# Patient Record
Sex: FEMALE | Race: WHITE | NOT HISPANIC OR LATINO | Employment: FULL TIME | URBAN - METROPOLITAN AREA
[De-identification: names, ages, dates, MRNs, and addresses within clinical notes are randomized per-mention and may not be internally consistent; named-entity substitution may affect disease eponyms.]

---

## 2019-09-27 ENCOUNTER — OFFICE VISIT (OUTPATIENT)
Dept: OBGYN CLINIC | Facility: CLINIC | Age: 39
End: 2019-09-27
Payer: COMMERCIAL

## 2019-09-27 VITALS — DIASTOLIC BLOOD PRESSURE: 86 MMHG | BODY MASS INDEX: 33.67 KG/M2 | WEIGHT: 208.6 LBS | SYSTOLIC BLOOD PRESSURE: 142 MMHG

## 2019-09-27 DIAGNOSIS — N92.0 MENORRHAGIA WITH REGULAR CYCLE: ICD-10-CM

## 2019-09-27 DIAGNOSIS — Z01.419 ENCOUNTER FOR GYNECOLOGICAL EXAMINATION: Primary | ICD-10-CM

## 2019-09-27 DIAGNOSIS — Z12.39 SCREENING FOR MALIGNANT NEOPLASM OF BREAST: ICD-10-CM

## 2019-09-27 PROCEDURE — 99386 PREV VISIT NEW AGE 40-64: CPT | Performed by: NURSE PRACTITIONER

## 2019-09-27 PROCEDURE — G0145 SCR C/V CYTO,THINLAYER,RESCR: HCPCS | Performed by: NURSE PRACTITIONER

## 2019-09-27 PROCEDURE — 87624 HPV HI-RISK TYP POOLED RSLT: CPT | Performed by: NURSE PRACTITIONER

## 2019-09-27 RX ORDER — VILAZODONE HYDROCHLORIDE 10 MG/1
10 TABLET ORAL DAILY
Refills: 0 | COMMUNITY
Start: 2019-07-02 | End: 2020-09-29

## 2019-09-27 NOTE — PROGRESS NOTES
Payam Min  1980      CC:  Yearly exam    S:  35 y o  female is a new patient here for yearly exam  She denies breast concerns, abdominal/pelvic pain, abnormal vaginal discharge, bladder/bowel dysfunction  Her cycles are regular, but very heavy and crampy  Also has headaches the first 2 days of menses  Would like something to help with her menses  She is sexually active with same sex partner  She uses nothing for contraception  She is now going through a divorce with her  and has a girlfriend     LTCS x 2  Last Pap: 9/10/14 neg/neg  Last Mammo: 5 years ago d/t family hx of breast cancer  Sister at age 22 and aunt 54  Both BRCA gene negative  Has a son 12yo and daughter 7yo  Current Outpatient Medications:     VIIBRYD 10 MG tablet, Take 10 mg by mouth daily, Disp: , Rfl: 0  Social History     Socioeconomic History    Marital status: /Civil Union     Spouse name: Not on file    Number of children: Not on file    Years of education: Not on file    Highest education level: Not on file   Occupational History    Not on file   Social Needs    Financial resource strain: Not on file    Food insecurity:     Worry: Not on file     Inability: Not on file    Transportation needs:     Medical: Not on file     Non-medical: Not on file   Tobacco Use    Smoking status: Never Smoker    Smokeless tobacco: Never Used   Substance and Sexual Activity    Alcohol use:  Yes    Drug use: Yes     Types: Marijuana    Sexual activity: Yes     Partners: Male     Birth control/protection: None   Lifestyle    Physical activity:     Days per week: Not on file     Minutes per session: Not on file    Stress: Not on file   Relationships    Social connections:     Talks on phone: Not on file     Gets together: Not on file     Attends Tenriism service: Not on file     Active member of club or organization: Not on file     Attends meetings of clubs or organizations: Not on file     Relationship status: Not on file    Intimate partner violence:     Fear of current or ex partner: Not on file     Emotionally abused: Not on file     Physically abused: Not on file     Forced sexual activity: Not on file   Other Topics Concern    Not on file   Social History Narrative    Not on file     Family History   Problem Relation Age of Onset    Hyperlipidemia Father     Hypertension Father     Breast cancer Sister     Breast cancer Maternal Aunt     Osteoporosis Maternal Aunt       Past Medical History:   Diagnosis Date    Abnormal Pap smear of cervix     Arthritis     Asthma     Depression         O:  Blood pressure 142/86, weight 94 6 kg (208 lb 9 6 oz)  Patient appears well and is not in distress  Neck is supple without masses  Breasts are symmetrical without mass, tenderness, nipple discharge, skin changes or adenopathy  Abdomen is soft and nontender without masses  External genitals are normal without lesions or rashes  Vagina is normal without discharge or bleeding  Cervix is normal without discharge or lesion  Very anterior  Uterus is normal, mobile, nontender without palpable mass  Adnexa are normal, nontender, without palpable mass  A:  Yearly exam      P:   Pap collected today  Reviewed ASCCP guidelines with patient  Mammo slip provided  Recommend monthly SBE, annual CBE, and annual screening mammogram   Discussed options to help with menses including OCP, Nuvaring, depo, IUD, Lysteda  Patient is requesting a Mirena IUD  Risks and benefits discussed and reviewed insertion procedure  Recommend 200mcg Cytotec PO 12 hours prior to procedure, 600mg Ibuprofen PO one hour prior  Information pamphlet on Mirena provided for review  RTO one year for yearly exam or sooner as needed

## 2019-10-01 LAB
HPV HR 12 DNA CVX QL NAA+PROBE: NEGATIVE
HPV16 DNA CVX QL NAA+PROBE: NEGATIVE
HPV18 DNA CVX QL NAA+PROBE: NEGATIVE

## 2019-10-02 LAB
LAB AP GYN PRIMARY INTERPRETATION: NORMAL
Lab: NORMAL

## 2019-11-11 ENCOUNTER — TELEPHONE (OUTPATIENT)
Dept: OBGYN CLINIC | Facility: CLINIC | Age: 39
End: 2019-11-11

## 2019-11-15 ENCOUNTER — TELEPHONE (OUTPATIENT)
Dept: OBGYN CLINIC | Facility: CLINIC | Age: 39
End: 2019-11-15

## 2019-11-15 DIAGNOSIS — Z30.430 ENCOUNTER FOR IUD INSERTION: Primary | ICD-10-CM

## 2019-11-15 RX ORDER — MISOPROSTOL 200 UG/1
TABLET ORAL
Qty: 1 TABLET | Refills: 0 | Status: SHIPPED | OUTPATIENT
Start: 2019-11-15 | End: 2019-12-19

## 2019-11-15 NOTE — TELEPHONE ENCOUNTER
Called patient and left message recommending that patient take Cytotec 200mcg PO 12 hours prior to IUD insertion  Rx sent to [facility] on file

## 2019-11-15 NOTE — TELEPHONE ENCOUNTER
Buy and bill Mirena labeled and will be brought [facility]  Pt can be scheduled for insertion with Vidhya

## 2019-11-19 ENCOUNTER — PROCEDURE VISIT (OUTPATIENT)
Dept: OBGYN CLINIC | Facility: CLINIC | Age: 39
End: 2019-11-19
Payer: COMMERCIAL

## 2019-11-19 VITALS — DIASTOLIC BLOOD PRESSURE: 72 MMHG | BODY MASS INDEX: 34.54 KG/M2 | WEIGHT: 214 LBS | SYSTOLIC BLOOD PRESSURE: 126 MMHG

## 2019-11-19 DIAGNOSIS — Z30.430 ENCOUNTER FOR INSERTION OF INTRAUTERINE CONTRACEPTIVE DEVICE (IUD): Primary | ICD-10-CM

## 2019-11-19 PROCEDURE — 58300 INSERT INTRAUTERINE DEVICE: CPT | Performed by: NURSE PRACTITIONER

## 2019-11-22 NOTE — PROGRESS NOTES
Iud insertions  Date/Time: 11/19/2019 1:30 PM  Performed by: ALESSIO Simpson  Authorized by: ALESSIO Simpson     Consent:     Consent obtained:  Verbal and written    Consent given by:  Patient    Procedure risks and benefits discussed: yes      Patient questions answered: yes      Patient agrees, verbalizes understanding, and wants to proceed: yes    Procedure:     Pelvic exam performed: yes      Speculum placed in vagina: yes      IUD inserted with no complications: no (unsuccessful attempt)    Comments:      Attempted to insert Mirena IUD, but was unsuccessful d/t difficulty finding cervix  Recommended that patient take 200mcg Cytotec PO 12 hours prior to insertion and will return when Dr Alcantara is also available in the office for assistance  Patient expresses understanding and agrees to plan

## 2019-12-19 ENCOUNTER — PROCEDURE VISIT (OUTPATIENT)
Dept: OBGYN CLINIC | Facility: CLINIC | Age: 39
End: 2019-12-19
Payer: COMMERCIAL

## 2019-12-19 VITALS — SYSTOLIC BLOOD PRESSURE: 140 MMHG | BODY MASS INDEX: 34.93 KG/M2 | DIASTOLIC BLOOD PRESSURE: 76 MMHG | WEIGHT: 216.4 LBS

## 2019-12-19 DIAGNOSIS — Z30.430 ENCOUNTER FOR INSERTION OF MIRENA IUD: Primary | ICD-10-CM

## 2019-12-19 PROCEDURE — 58300 INSERT INTRAUTERINE DEVICE: CPT | Performed by: NURSE PRACTITIONER

## 2019-12-19 RX ORDER — OMEGA-3S/DHA/EPA/FISH OIL/D3 300MG-1000
400 CAPSULE ORAL DAILY
COMMUNITY

## 2019-12-19 NOTE — PROGRESS NOTES
Iud insertions  Date/Time: 12/19/2019 2:00 PM  Performed by: Eyal Reece MD  Authorized by: Eyal Reece MD     Consent:     Consent obtained:  Verbal and written    Consent given by:  Patient    Procedure risks and benefits discussed: yes      Patient questions answered: yes      Patient agrees, verbalizes understanding, and wants to proceed: yes    Procedure:     Pelvic exam performed: yes      Cervix cleaned and prepped: yes      Speculum placed in vagina: yes      Tenaculum applied to cervix: yes      Uterus sounded: yes      Uterus sound depth (cm):  9    IUD inserted with no complications: yes      IUD type:  Mirena    Strings trimmed: yes    Post-procedure:     Patient tolerated procedure well: yes      Patient will follow up after next period: yes    Comments: This patient presents for IUD insertion  The risks/benefits, SE's/AE's were reviewed and all questions were answered  Written and verbal consent were obtained by myself  Time out was performed and allergies were confirmed  The patient was positioned in lithotomy position and spec was inserted  Cervix was visualized and cleansed with betadine  Cervix is located anteriorly and to the left  Tenaculum was applied to the cervix  Uterus sounded to 9cm  The IUD was successfully  inserted by Dr Stahl without difficulty and the strings were trimmed  Hemostasis was observed and all instruments were removed  The patient tolerated well  Reviewed reasons to call and sx to report incl excessive bleeding, pain unrelieved by ibuprofen or symptoms of infection such as fever, chills or foul smelling discharge  Recommended returning to the office in 4-6 weeks for IUD string check and utilizing condoms as back up until that time  The patient agrees to the plan

## 2020-01-23 ENCOUNTER — OFFICE VISIT (OUTPATIENT)
Dept: OBGYN CLINIC | Facility: CLINIC | Age: 40
End: 2020-01-23
Payer: COMMERCIAL

## 2020-01-23 VITALS — WEIGHT: 215.8 LBS | DIASTOLIC BLOOD PRESSURE: 64 MMHG | SYSTOLIC BLOOD PRESSURE: 122 MMHG | BODY MASS INDEX: 34.83 KG/M2

## 2020-01-23 DIAGNOSIS — Z30.431 IUD CHECK UP: Primary | ICD-10-CM

## 2020-01-23 PROBLEM — Z30.430 ENCOUNTER FOR INSERTION OF MIRENA IUD: Status: RESOLVED | Noted: 2019-12-19 | Resolved: 2020-01-23

## 2020-01-23 PROBLEM — Z30.430 ENCOUNTER FOR IUD INSERTION: Status: RESOLVED | Noted: 2019-11-15 | Resolved: 2020-01-23

## 2020-01-23 PROCEDURE — 99213 OFFICE O/P EST LOW 20 MIN: CPT | Performed by: NURSE PRACTITIONER

## 2020-01-23 NOTE — PROGRESS NOTES
Assessment/Plan:    No problem-specific Assessment & Plan notes found for this encounter  There are no diagnoses linked to this encounter  Subjective:      Patient ID: Zak Morrow is a 35 y o  female  Zak Morrow  1980      CC: IUD check    S: 35 y o  female here for IUD check  She is status post placement of a Mirena IUD on 12/19/19  She has had mild irregular bleeding since placement, but nothing bothersome to her  She has had no pain or other side effects  She is happy with the IUD and desires to continue  No LMP recorded    Current Outpatient Medications:   cholecalciferol (VITAMIN D3) 400 units tablet, Take 400 Units by mouth daily, Disp: , Rfl:   cyanocobalamin (VITAMIN B-12) 100 MCG tablet, Take 100 mcg by mouth daily, Disp: , Rfl:   VIIBRYD 10 MG tablet, Take 10 mg by mouth daily, Disp: , Rfl: 0  Social History    Socioeconomic History      Marital status: /Civil Union      Spouse name: Not on file      Number of children: Not on file      Years of education: Not on file      Highest education level: Not on file    Occupational History      Not on file    Social Needs      Financial resource strain: Not on file      Food insecurity:        Worry: Not on file        Inability: Not on file      Transportation needs:        Medical: Not on file        Non-medical: Not on file    Tobacco Use      Smoking status: Never Smoker      Smokeless tobacco: Never Used    Substance and Sexual Activity      Alcohol use: Yes      Drug use: Yes        Types: Marijuana      Sexual activity: Yes        Partners: Male        Birth control/protection: None    Lifestyle      Physical activity:        Days per week: Not on file        Minutes per session: Not on file      Stress: Not on file    Relationships      Social connections:        Talks on phone: Not on file        Gets together: Not on file        Attends Roman Catholic service: Not on file        Active member of club or organization: Not on file        Attends meetings of clubs or organizations: Not on file        Relationship status: Not on file      Intimate partner violence:        Fear of current or ex partner: Not on file        Emotionally abused: Not on file        Physically abused: Not on file        Forced sexual activity: Not on file    Other Topics      Concerns:        Not on file    Social History Narrative      Not on file    Review of patient's family history indicates:  Problem: Hyperlipidemia      Relation: Father          Age of Onset: (Not Specified)  Problem: Hypertension      Relation: Father          Age of Onset: (Not Specified)  Problem: Breast cancer      Relation: Sister          Age of Onset: (Not Specified)  Problem: Breast cancer      Relation: Maternal Aunt          Age of Onset: (Not Specified)  Problem: Osteoporosis      Relation: Maternal Aunt          Age of Onset: (Not Specified)    Past Medical History:  No date: Abnormal Pap smear of cervix  No date: Arthritis  No date: Asthma  No date: Depression    O:  Blood pressure 122/64, weight 97 9 kg (215 lb 12 8 oz)  Abdomen is soft and nontender  External genitals are normal without rashes or lesions  Vagina is normal without discharge or bleeding  Cervix is normal without discharge or lesion  IUD strings are normal      A:  IUD in place    P:  Patient was reassured that irregular bleeding is common for the first six months of IUD use and that this should slowly resolve over time  She will call with any persistently abnormal bleeding or other problems  She will return for her yearly exam 9/2020 or sooner prn          The following portions of the patient's history were reviewed and updated as appropriate: allergies, current medications, past family history, past medical history, past social history, past surgical history and problem list     Review of Systems      Objective:      /64   Wt 97 9 kg (215 lb 12 8 oz)   BMI 34 83 kg/m² Physical Exam

## 2020-05-22 ENCOUNTER — OFFICE VISIT (OUTPATIENT)
Dept: URGENT CARE | Age: 40
End: 2020-05-22
Payer: COMMERCIAL

## 2020-05-22 VITALS
SYSTOLIC BLOOD PRESSURE: 132 MMHG | WEIGHT: 215 LBS | HEART RATE: 69 BPM | RESPIRATION RATE: 18 BRPM | BODY MASS INDEX: 33.74 KG/M2 | DIASTOLIC BLOOD PRESSURE: 61 MMHG | OXYGEN SATURATION: 98 % | TEMPERATURE: 98.3 F | HEIGHT: 67 IN

## 2020-05-22 DIAGNOSIS — B96.89 ACUTE BACTERIAL BRONCHITIS: Primary | ICD-10-CM

## 2020-05-22 DIAGNOSIS — J20.8 ACUTE BACTERIAL BRONCHITIS: Primary | ICD-10-CM

## 2020-05-22 PROCEDURE — G0382 LEV 3 HOSP TYPE B ED VISIT: HCPCS | Performed by: PHYSICIAN ASSISTANT

## 2020-05-22 RX ORDER — SERTRALINE HYDROCHLORIDE 25 MG/1
TABLET, FILM COATED ORAL
COMMUNITY
Start: 2020-02-24

## 2020-05-22 RX ORDER — MONTELUKAST SODIUM 10 MG/1
TABLET ORAL
COMMUNITY
Start: 2020-05-13

## 2020-05-22 RX ORDER — BENZONATATE 200 MG/1
CAPSULE ORAL
COMMUNITY
Start: 2020-05-15 | End: 2020-09-29

## 2020-05-22 RX ORDER — PREDNISONE 10 MG/1
TABLET ORAL
Qty: 21 TABLET | Refills: 0 | Status: SHIPPED | OUTPATIENT
Start: 2020-05-22

## 2020-05-22 RX ORDER — AZITHROMYCIN 250 MG/1
TABLET, FILM COATED ORAL
Qty: 6 TABLET | Refills: 0 | Status: SHIPPED | OUTPATIENT
Start: 2020-05-22 | End: 2020-05-26

## 2020-05-22 RX ORDER — ALBUTEROL SULFATE 90 UG/1
2 AEROSOL, METERED RESPIRATORY (INHALATION) EVERY 6 HOURS PRN
Qty: 1 INHALER | Refills: 0 | Status: SHIPPED | OUTPATIENT
Start: 2020-05-22

## 2020-06-11 DIAGNOSIS — B96.89 ACUTE BACTERIAL BRONCHITIS: ICD-10-CM

## 2020-06-11 DIAGNOSIS — J20.8 ACUTE BACTERIAL BRONCHITIS: ICD-10-CM

## 2020-06-15 RX ORDER — ALBUTEROL SULFATE 90 UG/1
AEROSOL, METERED RESPIRATORY (INHALATION)
Qty: 8.5 INHALER | Refills: 0 | OUTPATIENT
Start: 2020-06-15

## 2020-09-29 ENCOUNTER — ANNUAL EXAM (OUTPATIENT)
Dept: OBGYN CLINIC | Facility: CLINIC | Age: 40
End: 2020-09-29
Payer: COMMERCIAL

## 2020-09-29 VITALS — BODY MASS INDEX: 34.3 KG/M2 | DIASTOLIC BLOOD PRESSURE: 74 MMHG | WEIGHT: 219 LBS | SYSTOLIC BLOOD PRESSURE: 124 MMHG

## 2020-09-29 DIAGNOSIS — Z12.31 SCREENING MAMMOGRAM, ENCOUNTER FOR: ICD-10-CM

## 2020-09-29 DIAGNOSIS — Z01.419 ENCOUNTER FOR GYNECOLOGICAL EXAMINATION: Primary | ICD-10-CM

## 2020-09-29 DIAGNOSIS — Z97.5 IUD (INTRAUTERINE DEVICE) IN PLACE: ICD-10-CM

## 2020-09-29 PROBLEM — N92.0 MENORRHAGIA WITH REGULAR CYCLE: Status: RESOLVED | Noted: 2019-09-27 | Resolved: 2020-09-29

## 2020-09-29 PROBLEM — Z30.431 IUD CHECK UP: Status: RESOLVED | Noted: 2020-01-23 | Resolved: 2020-09-29

## 2020-09-29 PROCEDURE — 99396 PREV VISIT EST AGE 40-64: CPT | Performed by: NURSE PRACTITIONER

## 2020-09-29 NOTE — PROGRESS NOTES
César Haskins  1980      CC:  Yearly exam    S:  38 y o  female here for yearly exam  She denies breast concerns, abdominal/pelvic pain, abnormal vaginal discharge, bladder/bowel dysfunction  She has minimal spotting every month with Mirena in place  This is significant improvement from her menorrhagia prior to the IUD  Mirena was inserted 12/19/19  She is very happy with this and desires to continue  She is sexually active with female partner  Hx of breast cancer in both sister and maternal aunt  Last Pap: 9/27/19 neg/neg   Last Mammo: 11/25/14    Her son and daughter are doing well  Patient works in [employer]      Current Outpatient Medications:     ADVAIR DISKUS 500-50 MCG/DOSE inhaler, TAKE 1 2PUFF(S) INHALED 2 TIMES A DAY, Disp: , Rfl:     albuterol (PROVENTIL HFA,VENTOLIN HFA) 90 mcg/act inhaler, Inhale 2 puffs every 6 (six) hours as needed for wheezing, Disp: 1 Inhaler, Rfl: 0    cholecalciferol (VITAMIN D3) 400 units tablet, Take 400 Units by mouth daily, Disp: , Rfl:     cyanocobalamin (VITAMIN B-12) 100 MCG tablet, Take 100 mcg by mouth daily, Disp: , Rfl:     montelukast (SINGULAIR) 10 mg tablet, TAKE 1 TABLET BY MOUTH EVERY DAY FOR ALLERGIES, Disp: , Rfl:     predniSONE 10 mg tablet, Six tablets day 1; 5 tablets day to; 4 tablets day 3; 3 tablets day 4; 2 tablets day 5; and 1 tablet day 6, Disp: 21 tablet, Rfl: 0    sertraline (ZOLOFT) 25 mg tablet, sertraline 25 mg tablet, Disp: , Rfl:   Social History     Socioeconomic History    Marital status: /Civil Union     Spouse name: Not on file    Number of children: Not on file    Years of education: Not on file    Highest education level: Not on file   Occupational History    Not on file   Social Needs    Financial resource strain: Not on file    Food insecurity     Worry: Not on file     Inability: Not on file    Transportation needs     Medical: Not on file     Non-medical: Not on file   Tobacco Use    Smoking status: Never Smoker    Smokeless tobacco: Never Used   Substance and Sexual Activity    Alcohol use: Yes    Drug use: Yes     Types: Marijuana    Sexual activity: Yes     Partners: Male     Birth control/protection: None, I U D  Lifestyle    Physical activity     Days per week: Not on file     Minutes per session: Not on file    Stress: Not on file   Relationships    Social connections     Talks on phone: Not on file     Gets together: Not on file     Attends Lutheran service: Not on file     Active member of club or organization: Not on file     Attends meetings of clubs or organizations: Not on file     Relationship status: Not on file    Intimate partner violence     Fear of current or ex partner: Not on file     Emotionally abused: Not on file     Physically abused: Not on file     Forced sexual activity: Not on file   Other Topics Concern    Not on file   Social History Narrative    Not on file     Family History   Problem Relation Age of Onset    Hyperlipidemia Father     Hypertension Father     Breast cancer Sister     Breast cancer Maternal Aunt     Osteoporosis Maternal Aunt       Past Medical History:   Diagnosis Date    Abnormal Pap smear of cervix     Arthritis     Asthma     Depression         O:  Blood pressure 124/74, weight 99 3 kg (219 lb)  Patient appears well and is not in distress  Neck is supple without masses  Breasts are symmetrical without mass, tenderness, nipple discharge, skin changes or adenopathy  Abdomen is soft and nontender without masses  External genitals are normal without lesions or rashes  Vagina is normal without discharge or bleeding  Cervix is normal without discharge or lesion  IUD strings visualized and normal   Uterus is normal, mobile, nontender without palpable mass  Adnexa are normal, nontender, without palpable mass  A:  Yearly exam      P:   Pap up to date  Due 2024  Reviewed ASCCP guidelines with patient     Emphasized importance of annual mammogram  Mammo slip provided  Recommend monthly SBE, annual CBE, and annual screening mammogram   Will continue with Mirena  RTO one year for yearly exam or sooner as needed

## 2020-12-21 ENCOUNTER — OFFICE VISIT (OUTPATIENT)
Dept: URGENT CARE | Facility: MEDICAL CENTER | Age: 40
End: 2020-12-21
Payer: COMMERCIAL

## 2020-12-21 VITALS
SYSTOLIC BLOOD PRESSURE: 161 MMHG | BODY MASS INDEX: 33.91 KG/M2 | DIASTOLIC BLOOD PRESSURE: 83 MMHG | WEIGHT: 211 LBS | HEART RATE: 82 BPM | TEMPERATURE: 98.4 F | RESPIRATION RATE: 20 BRPM | OXYGEN SATURATION: 99 % | HEIGHT: 66 IN

## 2020-12-21 DIAGNOSIS — R42 DIZZINESS AND GIDDINESS: ICD-10-CM

## 2020-12-21 DIAGNOSIS — Z20.822 ENCOUNTER FOR LABORATORY TESTING FOR COVID-19 VIRUS: Primary | ICD-10-CM

## 2020-12-21 PROCEDURE — G0382 LEV 3 HOSP TYPE B ED VISIT: HCPCS | Performed by: PHYSICIAN ASSISTANT

## 2020-12-21 PROCEDURE — U0003 INFECTIOUS AGENT DETECTION BY NUCLEIC ACID (DNA OR RNA); SEVERE ACUTE RESPIRATORY SYNDROME CORONAVIRUS 2 (SARS-COV-2) (CORONAVIRUS DISEASE [COVID-19]), AMPLIFIED PROBE TECHNIQUE, MAKING USE OF HIGH THROUGHPUT TECHNOLOGIES AS DESCRIBED BY CMS-2020-01-R: HCPCS | Performed by: PHYSICIAN ASSISTANT

## 2020-12-21 RX ORDER — MECLIZINE HYDROCHLORIDE 25 MG/1
25 TABLET ORAL EVERY 12 HOURS PRN
Qty: 20 TABLET | Refills: 0 | Status: SHIPPED | OUTPATIENT
Start: 2020-12-21

## 2020-12-21 RX ORDER — VILAZODONE HYDROCHLORIDE 10 MG/1
10 TABLET ORAL DAILY
COMMUNITY
Start: 2020-11-14

## 2020-12-23 LAB — SARS-COV-2 RNA SPEC QL NAA+PROBE: NOT DETECTED

## 2021-03-10 DIAGNOSIS — Z23 ENCOUNTER FOR IMMUNIZATION: ICD-10-CM

## 2021-03-16 ENCOUNTER — IMMUNIZATIONS (OUTPATIENT)
Dept: FAMILY MEDICINE CLINIC | Facility: HOSPITAL | Age: 41
End: 2021-03-16

## 2021-03-16 DIAGNOSIS — Z23 ENCOUNTER FOR IMMUNIZATION: Primary | ICD-10-CM

## 2021-03-16 PROCEDURE — 0011A SARS-COV-2 / COVID-19 MRNA VACCINE (MODERNA) 100 MCG: CPT

## 2021-03-16 PROCEDURE — 91301 SARS-COV-2 / COVID-19 MRNA VACCINE (MODERNA) 100 MCG: CPT

## 2021-04-15 ENCOUNTER — IMMUNIZATIONS (OUTPATIENT)
Dept: FAMILY MEDICINE CLINIC | Facility: HOSPITAL | Age: 41
End: 2021-04-15

## 2021-04-15 DIAGNOSIS — Z23 ENCOUNTER FOR IMMUNIZATION: Primary | ICD-10-CM

## 2021-04-15 PROCEDURE — 0012A SARS-COV-2 / COVID-19 MRNA VACCINE (MODERNA) 100 MCG: CPT

## 2021-04-15 PROCEDURE — 91301 SARS-COV-2 / COVID-19 MRNA VACCINE (MODERNA) 100 MCG: CPT

## 2021-04-23 ENCOUNTER — OFFICE VISIT (OUTPATIENT)
Dept: URGENT CARE | Facility: CLINIC | Age: 41
End: 2021-04-23
Payer: COMMERCIAL

## 2021-04-23 VITALS
RESPIRATION RATE: 20 BRPM | OXYGEN SATURATION: 97 % | DIASTOLIC BLOOD PRESSURE: 75 MMHG | WEIGHT: 220 LBS | SYSTOLIC BLOOD PRESSURE: 172 MMHG | TEMPERATURE: 99.4 F | HEART RATE: 81 BPM | HEIGHT: 66 IN | BODY MASS INDEX: 35.36 KG/M2

## 2021-04-23 DIAGNOSIS — L23.7 ALLERGIC CONTACT DERMATITIS DUE TO PLANTS, EXCEPT FOOD: Primary | ICD-10-CM

## 2021-04-23 PROCEDURE — G0382 LEV 3 HOSP TYPE B ED VISIT: HCPCS | Performed by: PHYSICIAN ASSISTANT

## 2021-04-23 RX ORDER — TRIAMCINOLONE ACETONIDE 1 MG/G
CREAM TOPICAL
COMMUNITY
Start: 2021-04-15

## 2021-04-23 RX ORDER — METHYLPREDNISOLONE 4 MG/1
TABLET ORAL
COMMUNITY
Start: 2021-04-15

## 2021-04-23 RX ORDER — PREDNISONE 10 MG/1
TABLET ORAL
Qty: 21 TABLET | Refills: 0 | Status: SHIPPED | OUTPATIENT
Start: 2021-04-23

## 2021-04-23 NOTE — PROGRESS NOTES
[facility]        NAME: Ruth Ann Cornejo is a 38 y o  female  : 1980    MRN: [id number]  DATE: 2021  TIME: 4:01 PM    Assessment and Plan   Allergic contact dermatitis due to plants, except food [L23 7]  1  Allergic contact dermatitis due to plants, except food  predniSONE 10 mg tablet         Patient Instructions     Take prednisone as directed  Monitor for worsening symptoms   C/w OTC symptom relief as needed  Follow up with PCP in 3-5 days  Proceed to  ER if symptoms worsen  Chief Complaint     Chief Complaint   Patient presents with    Rash     started last weekend, did a telehealth visit was put on creams and tapered steriod pack, ran out of both medications, rash is spreading and even more itching with swelling noted  b/l arms abdomen and thigh         History of Present Illness       Patient presents with complaint of itchy red rash x 1 week  She reports having had a history of bad poison ivy and states that this rash has only worsened despite a medrol dose pack  She states that she had been doing a lot of yard work but was wearing gloves and notes that the rash spares her hands  She states that she is otherwise well denying fever, chills, sweats, dyspnea, and chest tightness  Review of Systems   Review of Systems   Constitutional: Negative for chills and fever  HENT: Negative for ear pain and sore throat  Eyes: Negative for pain and visual disturbance  Respiratory: Negative for cough and shortness of breath  Cardiovascular: Negative for chest pain and palpitations  Gastrointestinal: Negative for abdominal pain and vomiting  Genitourinary: Negative for dysuria and hematuria  Musculoskeletal: Negative for arthralgias and back pain  Skin: Positive for rash  Negative for color change  Neurological: Negative for seizures and syncope  All other systems reviewed and are negative          Current Medications       Current Outpatient Medications:     ADVAIR DISKUS 500-50 MCG/DOSE inhaler, TAKE 1 2PUFF(S) INHALED 2 TIMES A DAY, Disp: , Rfl:     cholecalciferol (VITAMIN D3) 400 units tablet, Take 400 Units by mouth daily, Disp: , Rfl:     cyanocobalamin (VITAMIN B-12) 100 MCG tablet, Take 100 mcg by mouth daily, Disp: , Rfl:     meclizine (ANTIVERT) 25 mg tablet, Take 1 tablet (25 mg total) by mouth every 12 (twelve) hours as needed for dizziness, Disp: 20 tablet, Rfl: 0    montelukast (SINGULAIR) 10 mg tablet, TAKE 1 TABLET BY MOUTH EVERY DAY FOR ALLERGIES, Disp: , Rfl:     sertraline (ZOLOFT) 25 mg tablet, sertraline 25 mg tablet, Disp: , Rfl:     Viibryd 10 MG tablet, Take 10 mg by mouth daily, Disp: , Rfl:     albuterol (PROVENTIL HFA,VENTOLIN HFA) 90 mcg/act inhaler, Inhale 2 puffs every 6 (six) hours as needed for wheezing (Patient not taking: Reported on 12/21/2020), Disp: 1 Inhaler, Rfl: 0    methylPREDNISolone 4 MG tablet therapy pack, TAKE 6 TABLETS ON DAY 1 AS DIRECTED ON PACKAGE AND DECREASE BY 1 TAB EACH DAY FOR A TOTAL OF 6 DAYS, Disp: , Rfl:     predniSONE 10 mg tablet, Six tablets day 1; 5 tablets day to; 4 tablets day 3; 3 tablets day 4; 2 tablets day 5; and 1 tablet day 6 (Patient not taking: Reported on 12/21/2020), Disp: 21 tablet, Rfl: 0    predniSONE 10 mg tablet, Take 6 tabs on day 1, 5 tabs on day 2, 4 tabs on day 3, 3 tabs on day 4, 2 tabs on day 5, and 1 tab on day 6, Disp: 21 tablet, Rfl: 0    triamcinolone (KENALOG) 0 1 % cream, APPLY TOPICALLY 3 TIMES A DAY AS NEEDED, Disp: , Rfl:     Current Allergies     Allergies as of 04/23/2021    (No Known Allergies)            The following portions of the patient's history were reviewed and updated as appropriate: allergies, current medications, past family history, past medical history, past social history, past surgical history and problem list      Past Medical History:   Diagnosis Date    Abnormal Pap smear of cervix     Arthritis     Asthma     Depression        Past Surgical History:   Procedure Laterality Date     SECTION         Family History   Problem Relation Age of Onset    Hyperlipidemia Father     Hypertension Father     Breast cancer Sister     Breast cancer Maternal Aunt     Osteoporosis Maternal Aunt          Medications have been verified  Objective   BP (!) 172/75   Pulse 81   Temp 99 4 °F (37 4 °C) (Tympanic)   Resp 20   Ht 5' 6" (1 676 m)   Wt 99 8 kg (220 lb)   SpO2 97%   BMI 35 51 kg/m²   No LMP recorded  Patient has had an implant  Physical Exam     Physical Exam  Vitals signs and nursing note reviewed  Constitutional:       General: She is not in acute distress  Appearance: Normal appearance  She is well-developed  She is not ill-appearing or diaphoretic  HENT:      Head: Normocephalic and atraumatic  Eyes:      Conjunctiva/sclera: Conjunctivae normal       Pupils: Pupils are equal, round, and reactive to light  Neck:      Musculoskeletal: Normal range of motion and neck supple  Cardiovascular:      Rate and Rhythm: Normal rate and regular rhythm  Heart sounds: Normal heart sounds  Pulmonary:      Effort: Pulmonary effort is normal  No respiratory distress  Breath sounds: Normal breath sounds  No stridor  Lymphadenopathy:      Cervical: No cervical adenopathy  Skin:     General: Skin is warm and dry  Capillary Refill: Capillary refill takes less than 2 seconds  Findings: Rash (diffuse patches of erythematous rash w/ vesicles on b/l UE & LE & torso; excoriations present) present  Neurological:      Mental Status: She is alert and oriented to person, place, and time  Cranial Nerves: No cranial nerve deficit  Sensory: No sensory deficit  Psychiatric:         Behavior: Behavior normal          Thought Content:  Thought content normal

## 2021-10-06 ENCOUNTER — ANNUAL EXAM (OUTPATIENT)
Dept: OBGYN CLINIC | Facility: CLINIC | Age: 41
End: 2021-10-06
Payer: COMMERCIAL

## 2021-10-06 VITALS
BODY MASS INDEX: 35.77 KG/M2 | SYSTOLIC BLOOD PRESSURE: 130 MMHG | WEIGHT: 222.6 LBS | HEIGHT: 66 IN | DIASTOLIC BLOOD PRESSURE: 82 MMHG

## 2021-10-06 DIAGNOSIS — Z12.31 ENCOUNTER FOR SCREENING MAMMOGRAM FOR MALIGNANT NEOPLASM OF BREAST: ICD-10-CM

## 2021-10-06 DIAGNOSIS — Z97.5 IUD (INTRAUTERINE DEVICE) IN PLACE: ICD-10-CM

## 2021-10-06 DIAGNOSIS — Z01.419 ENCOUNTER FOR ANNUAL ROUTINE GYNECOLOGICAL EXAMINATION: Primary | ICD-10-CM

## 2021-10-06 PROBLEM — B96.89 ACUTE BACTERIAL BRONCHITIS: Status: RESOLVED | Noted: 2020-05-22 | Resolved: 2021-10-06

## 2021-10-06 PROBLEM — F33.0 MILD EPISODE OF RECURRENT MAJOR DEPRESSIVE DISORDER (HCC): Status: ACTIVE | Noted: 2021-07-29

## 2021-10-06 PROBLEM — J20.8 ACUTE BACTERIAL BRONCHITIS: Status: RESOLVED | Noted: 2020-05-22 | Resolved: 2021-10-06

## 2021-10-06 PROCEDURE — 99396 PREV VISIT EST AGE 40-64: CPT | Performed by: OBSTETRICS & GYNECOLOGY

## 2021-10-06 RX ORDER — BUPROPION HYDROCHLORIDE 300 MG/1
300 TABLET ORAL EVERY MORNING
COMMUNITY
Start: 2021-09-15

## 2022-01-11 ENCOUNTER — TELEPHONE (OUTPATIENT)
Dept: DERMATOLOGY | Facility: CLINIC | Age: 42
End: 2022-01-11

## 2024-02-21 PROBLEM — Z01.419 ENCOUNTER FOR GYNECOLOGICAL EXAMINATION: Status: RESOLVED | Noted: 2019-09-27 | Resolved: 2024-02-21

## 2024-11-05 ENCOUNTER — TELEPHONE (OUTPATIENT)
Age: 44
End: 2024-11-05

## 2024-11-05 NOTE — TELEPHONE ENCOUNTER
Patient set up a my chart appointment and she put in the notes that her iud might have moved or dislodged.  I am trying to see if you want to see her sooner than scheduled.  Please advise. Thank you

## 2024-11-05 NOTE — TELEPHONE ENCOUNTER
Spoke with Dr. Alcantara, and if patient does not have acute pain, patient can keep appointment. Patient is re-establishing care and can reach out to reschedule if needed.

## 2024-11-14 ENCOUNTER — OFFICE VISIT (OUTPATIENT)
Age: 44
End: 2024-11-14
Payer: COMMERCIAL

## 2024-11-14 VITALS
WEIGHT: 190.4 LBS | SYSTOLIC BLOOD PRESSURE: 122 MMHG | BODY MASS INDEX: 29.88 KG/M2 | HEIGHT: 67 IN | DIASTOLIC BLOOD PRESSURE: 72 MMHG

## 2024-11-14 DIAGNOSIS — Z97.5 IUD (INTRAUTERINE DEVICE) IN PLACE: ICD-10-CM

## 2024-11-14 DIAGNOSIS — N92.1 BREAKTHROUGH BLEEDING WITH IUD: Primary | ICD-10-CM

## 2024-11-14 DIAGNOSIS — Z11.3 SCREENING FOR STD (SEXUALLY TRANSMITTED DISEASE): ICD-10-CM

## 2024-11-14 DIAGNOSIS — Z97.5 BREAKTHROUGH BLEEDING WITH IUD: Primary | ICD-10-CM

## 2024-11-14 PROBLEM — G89.29 CHRONIC UPPER BACK PAIN: Status: ACTIVE | Noted: 2022-05-09

## 2024-11-14 PROBLEM — M54.9 CHRONIC UPPER BACK PAIN: Status: ACTIVE | Noted: 2022-05-09

## 2024-11-14 PROBLEM — N62 MACROMASTIA: Status: ACTIVE | Noted: 2022-05-09

## 2024-11-14 PROCEDURE — 99203 OFFICE O/P NEW LOW 30 MIN: CPT | Performed by: OBSTETRICS & GYNECOLOGY

## 2024-11-14 RX ORDER — HYDROCORTISONE 25 MG/G
CREAM TOPICAL
COMMUNITY
Start: 2024-10-21

## 2024-11-14 RX ORDER — TIRZEPATIDE 7.5 MG/.5ML
10 INJECTION, SOLUTION SUBCUTANEOUS
COMMUNITY
Start: 2024-10-10

## 2024-11-14 NOTE — PROGRESS NOTES
GYN Problem Visit     HPI:  Patient has had a Mirena IUD snce 12/2019; inserted due menorrhagia.  Was mostly amenorrheic with it until 2 months ago.  Notes cramping and bleeding after IC.  Both symptoms have diminished.  She denies any abnormal discharge or fever.  + stressors.  No new meds or weight changes.    PMH, PSH, Meds, Allergies, SocHx, FamHx reviewed and no changes noted.    ROS:  pertinent findings in HPI    Vitals:    11/14/24 0755   BP: 122/72     Physical Exam  Constitutional:       Appearance: Normal appearance.   Genitourinary:      Vulva normal.      Vaginal bleeding present.      No vaginal discharge or erythema.      No cervical friability, lesion or polyp.   HENT:      Head: Normocephalic.   Cardiovascular:      Rate and Rhythm: Normal rate and regular rhythm.   Pulmonary:      Effort: Pulmonary effort is normal.   Abdominal:      General: There is no distension.      Palpations: Abdomen is soft.   Musculoskeletal:         General: No swelling.   Neurological:      General: No focal deficit present.      Mental Status: She is alert and oriented to person, place, and time.   Skin:     General: Skin is warm and dry.   Psychiatric:         Mood and Affect: Mood normal.         Behavior: Behavior normal.   Vitals reviewed.      Impression/Plan:    1. Screening for STD (sexually transmitted disease)    - Chlamydia/GC FRANCIS, Confirmation    2. IUD (intrauterine device) in place      3. Breakthrough bleeding with IUD (Primary)    - reassured can happen; if persistent consider earlier swap

## 2024-11-19 ENCOUNTER — RESULTS FOLLOW-UP (OUTPATIENT)
Age: 44
End: 2024-11-19

## 2024-11-19 LAB
C TRACH RRNA SPEC QL NAA+PROBE: NEGATIVE
N GONORRHOEA RRNA SPEC QL NAA+PROBE: NEGATIVE

## 2025-06-20 ENCOUNTER — APPOINTMENT (OUTPATIENT)
Dept: URGENT CARE | Age: 45
End: 2025-06-20
Payer: COMMERCIAL

## 2025-06-20 ENCOUNTER — APPOINTMENT (OUTPATIENT)
Dept: RADIOLOGY | Age: 45
End: 2025-06-20
Payer: COMMERCIAL

## 2025-06-20 ENCOUNTER — OFFICE VISIT (OUTPATIENT)
Dept: URGENT CARE | Age: 45
End: 2025-06-20
Payer: COMMERCIAL

## 2025-06-20 VITALS
OXYGEN SATURATION: 99 % | RESPIRATION RATE: 18 BRPM | SYSTOLIC BLOOD PRESSURE: 140 MMHG | HEART RATE: 69 BPM | TEMPERATURE: 97.5 F | DIASTOLIC BLOOD PRESSURE: 70 MMHG

## 2025-06-20 DIAGNOSIS — S49.92XA INJURY OF LEFT SHOULDER, INITIAL ENCOUNTER: Primary | ICD-10-CM

## 2025-06-20 DIAGNOSIS — S49.92XA INJURY OF LEFT SHOULDER, INITIAL ENCOUNTER: ICD-10-CM

## 2025-06-20 PROCEDURE — 73030 X-RAY EXAM OF SHOULDER: CPT

## 2025-06-20 PROCEDURE — G0381 LEV 2 HOSP TYPE B ED VISIT: HCPCS | Performed by: NURSE PRACTITIONER

## 2025-06-20 RX ORDER — BUPROPION HYDROCHLORIDE 300 MG/1
300 TABLET ORAL DAILY
COMMUNITY

## 2025-06-20 RX ORDER — NAPROXEN 500 MG/1
500 TABLET ORAL 2 TIMES DAILY WITH MEALS
Qty: 14 TABLET | Refills: 0 | Status: SHIPPED | OUTPATIENT
Start: 2025-06-20

## 2025-06-20 NOTE — PROGRESS NOTES
Name: Vanita Laughlin      : 1980      MRN: [id number]  Encounter Provider: ALESSIO Alcantar  Encounter Date: 2025   Encounter department: [facility]  :  Assessment & Plan  Injury of left shoulder, initial encounter  Initial imaging negative for acute osseous fracture but cannot rule out other injury.  Will await official radiology read  Ice to shoulder  20 minutes at a time 3-4 times daily  Prescription NSAID as needed for pain  Orthopedic referral placed.  Call to schedule    Follow up with PCP in 3-5 days.  Proceed to  ER if symptoms worsen.     If tests are performed, our office will contact you with results only if changes need to made to the care plan discussed with you at the visit. You can review your full results on [facility].    Orders:    XR shoulder 2+ vw left; Future    Ambulatory Referral to Orthopedic Surgery; Future    naproxen (Naprosyn) 500 mg tablet; Take 1 tablet (500 mg total) by mouth 2 (two) times a day with meals        History of Present Illness   HPI  Vanita Laughlin is a 44 y.o. female who presents for evaluation of left shoulder pain.  Patient reports that on  she was playing roller [place] when a teammate came up behind her and pulled her left shoulder back which she was unprepared for.  She immediately felt pain and with movement she now has a popping sensation.  She has significantly decreased range of motion, cannot elevate her left arm above her head or adduct her arm more than 30 degrees.  She has been utilizing ice and taking ibuprofen for her symptoms.  Denies numbness, tingling or decrease sensation of the extremity.  Left shoulder is nontender to palpation.      Review of Systems   Respiratory:  Negative for chest tightness and shortness of breath.    Musculoskeletal:  Positive for arthralgias.        Left shoulder pain.  Decreased ROM   All other systems reviewed and are negative.         Objective   /70   Pulse 69   Temp  97.5 °F (36.4 °C)   Resp 18   SpO2 99%      Physical Exam  Vitals and nursing note reviewed.   Constitutional:       General: She is not in acute distress.     Appearance: Normal appearance. She is well-developed. She is not ill-appearing.   HENT:      Head: Normocephalic and atraumatic.   Pulmonary:      Effort: Pulmonary effort is normal.     Musculoskeletal:         General: Signs of injury present. No swelling or deformity.      Left shoulder: No swelling, deformity, effusion, bony tenderness or crepitus. Decreased range of motion. Decreased strength. Normal pulse.      Comments: Left shoulder pain with significantly decreased ROM.  No tenderness to palpation  Unable to elevate left arm above head.    Adduction RO< significantly decreased     Skin:     General: Skin is warm and dry.      Capillary Refill: Capillary refill takes less than 2 seconds.     Neurological:      Mental Status: She is alert.     Psychiatric:         Mood and Affect: Mood normal.

## 2025-06-20 NOTE — PATIENT INSTRUCTIONS
Initial imaging negative for acute osseous fracture but cannot rule out other injury.  Will await official radiology read  Ice to shoulder  20 minutes at a time 3-4 times daily  Prescription NSAID as needed for pain  Orthopedic referral placed.  Call to schedule    Follow up with PCP in 3-5 days.  Proceed to  ER if symptoms worsen.     If tests are performed, our office will contact you with results only if changes need to made to the care plan discussed with you at the visit. You can review your full results on [facility].     independent

## 2025-06-30 ENCOUNTER — OFFICE VISIT (OUTPATIENT)
Dept: OBGYN CLINIC | Facility: OTHER | Age: 45
End: 2025-06-30
Payer: COMMERCIAL

## 2025-06-30 VITALS — HEIGHT: 66 IN | WEIGHT: 187 LBS | BODY MASS INDEX: 30.05 KG/M2

## 2025-06-30 DIAGNOSIS — M75.42 ROTATOR CUFF IMPINGEMENT SYNDROME OF LEFT SHOULDER: Primary | ICD-10-CM

## 2025-06-30 DIAGNOSIS — S49.92XA INJURY OF LEFT SHOULDER, INITIAL ENCOUNTER: ICD-10-CM

## 2025-06-30 PROCEDURE — 99203 OFFICE O/P NEW LOW 30 MIN: CPT | Performed by: ORTHOPAEDIC SURGERY

## 2025-06-30 PROCEDURE — 20610 DRAIN/INJ JOINT/BURSA W/O US: CPT | Performed by: ORTHOPAEDIC SURGERY

## 2025-06-30 RX ORDER — LIDOCAINE HYDROCHLORIDE 10 MG/ML
4 INJECTION, SOLUTION INFILTRATION; PERINEURAL
Status: COMPLETED | OUTPATIENT
Start: 2025-06-30 | End: 2025-06-30

## 2025-06-30 RX ORDER — TRIAMCINOLONE ACETONIDE 40 MG/ML
40 INJECTION, SUSPENSION INTRA-ARTICULAR; INTRAMUSCULAR
Status: COMPLETED | OUTPATIENT
Start: 2025-06-30 | End: 2025-06-30

## 2025-06-30 RX ADMIN — LIDOCAINE HYDROCHLORIDE 4 ML: 10 INJECTION, SOLUTION INFILTRATION; PERINEURAL at 13:30

## 2025-06-30 RX ADMIN — TRIAMCINOLONE ACETONIDE 40 MG: 40 INJECTION, SUSPENSION INTRA-ARTICULAR; INTRAMUSCULAR at 13:30

## 2025-06-30 NOTE — PROGRESS NOTES
"Name: Vanita Laughlin      : 1980      MRN: [id number]  Encounter Provider: Martha Murcia MD  Encounter Date: 2025   Encounter department: [facility]  :  Assessment & Plan  Rotator cuff impingement syndrome of left shoulder  Explained the current clinical findings and reviewed the radiological findings with the patient.  Clinical assessment most consistent with left shoulder rotator cuff impingement syndrome/painful arc syndrome.  Patient was agreeable to and received left subacromial bursa cortisone injection on today's office visit.  Differential diagnosis includes potential glenoid labral tear with minimal anterior apprehension noted on today's office visit.  If she has persistent pain and shoulder apprehension despite the current measures we will consider getting an MRI arthrogram of the left shoulder for further evaluation.  Follow-up in 4 weeks.  Patient expressed understanding and was in agreement with the treatment plan.  Orders:    Large joint arthrocentesis: L subacromial bursa    Injury of left shoulder, initial encounter    Orders:    Ambulatory Referral to Orthopedic Surgery        History of Present Illness   HPI  Vanita Laughlin is a 44 y.o. female who presents who presents for evaluation of left shoulder pain present for several weeks.  No immediately preceding trauma to the left shoulder.  However, the patient reports that she has been doing roller [place] in the past and had multiple falls.  She does not have any known previous left shoulder problems or surgery.  Pain is mostly in the anterior and lateral aspect of the left shoulder and made worse with shoulder abduction or reaching behind her back.  No reported neurological symptoms of the left upper extremity.      Review of Systems       Objective   Ht 5' 6\" (1.676 m)   Wt 84.8 kg (187 lb)   BMI 30.18 kg/m²      Physical Exam  Nursing note reviewed.              Left Shoulder Exam     Tenderness "   Left shoulder tenderness location: Subacromial space.    Range of Motion   Active abduction:  160 (Has a painful arc of abduction between 20 to 120 degrees)   External rotation:  80   Forward flexion:  170   Internal rotation 0 degrees:  L2     Muscle Strength   Abduction: 5/5   Internal rotation: 5/5   External rotation: 5/5   Supraspinatus: 5/5   Subscapularis: 5/5   Biceps: 5/5     Tests   Aguilar test: positive  Cross arm: negative  Impingement: positive  Drop arm: negative    Other   Erythema: absent     Comments:  Patient reported a mild anterior apprehension and mild discomfort with [place]'s.           I have personally reviewed pertinent films in PACS and my interpretation is plain radiograph of the left shoulder from 6/20/2025 does not reveal any acute osseous injury or significant degenerative changes..   Large joint arthrocentesis: L subacromial bursa    Performed by: Martha Murcia MD  Authorized by: Martha Murcia MD    Universal Protocol:  Consent: Verbal consent obtained  Risks and benefits: risks, benefits and alternatives were discussed  Consent given by: patient  Patient understanding: patient states understanding of the procedure being performed  Test results: test results available and properly labeled  Site marked: the operative site was marked  Radiology Images displayed and confirmed. If images not available, report reviewed: imaging studies available  Required items: required blood products, implants, devices, and special equipment available  Patient identity confirmed: verbally with patient  Supporting Documentation  Indications: pain     Is this a Visco injection? NoProcedure Details  Location: shoulder - L subacromial bursa  Preparation: Patient was prepped and draped in the usual sterile fashion  Needle size: 22 G (3-1/2 inch spinal needle)  Ultrasound guidance: no (Ultrasound was used for needle localization)  Approach: lateral  Medications administered: 4 mL lidocaine 1 %; 40 mg  "triamcinolone acetonide 40 mg/mL    Patient tolerance: patient tolerated the procedure well with no immediate complications  Dressing:  Sterile dressing applied        Portions of the record may have been created with voice recognition software. Occasional wrong word or \"sound alike\" substitutions may have occurred due to the inherent limitations of voice recognition software. Please review the chart carefully and recognize, using context, where substitutions/typographical errors may have occurred.      "

## 2025-06-30 NOTE — ASSESSMENT & PLAN NOTE
Explained the current clinical findings and reviewed the radiological findings with the patient.  Clinical assessment most consistent with left shoulder rotator cuff impingement syndrome/painful arc syndrome.  Patient was agreeable to and received left subacromial bursa cortisone injection on today's office visit.  Differential diagnosis includes potential glenoid labral tear with minimal anterior apprehension noted on today's office visit.  If she has persistent pain and shoulder apprehension despite the current measures we will consider getting an MRI arthrogram of the left shoulder for further evaluation.  Follow-up in 4 weeks.  Patient expressed understanding and was in agreement with the treatment plan.  Orders:    Large joint arthrocentesis: L subacromial bursa

## 2025-07-31 ENCOUNTER — OFFICE VISIT (OUTPATIENT)
Dept: OBGYN CLINIC | Facility: OTHER | Age: 45
End: 2025-07-31
Payer: COMMERCIAL

## 2025-07-31 DIAGNOSIS — M75.02 ADHESIVE CAPSULITIS OF LEFT SHOULDER: Primary | ICD-10-CM

## 2025-07-31 PROCEDURE — 99203 OFFICE O/P NEW LOW 30 MIN: CPT | Performed by: ORTHOPAEDIC SURGERY

## 2025-08-11 ENCOUNTER — EVALUATION (OUTPATIENT)
Dept: PHYSICAL THERAPY | Facility: OTHER | Age: 45
End: 2025-08-11
Attending: ORTHOPAEDIC SURGERY
Payer: COMMERCIAL

## 2025-08-13 ENCOUNTER — OFFICE VISIT (OUTPATIENT)
Dept: PHYSICAL THERAPY | Facility: OTHER | Age: 45
End: 2025-08-13
Attending: ORTHOPAEDIC SURGERY
Payer: COMMERCIAL

## 2025-08-19 ENCOUNTER — OFFICE VISIT (OUTPATIENT)
Dept: PHYSICAL THERAPY | Facility: OTHER | Age: 45
End: 2025-08-19
Attending: ORTHOPAEDIC SURGERY
Payer: COMMERCIAL

## 2025-08-19 DIAGNOSIS — M75.02 ADHESIVE CAPSULITIS OF LEFT SHOULDER: Primary | ICD-10-CM

## 2025-08-19 PROCEDURE — 97140 MANUAL THERAPY 1/> REGIONS: CPT

## 2025-08-19 PROCEDURE — 97112 NEUROMUSCULAR REEDUCATION: CPT

## 2025-08-19 PROCEDURE — 97110 THERAPEUTIC EXERCISES: CPT

## 2025-08-21 ENCOUNTER — OFFICE VISIT (OUTPATIENT)
Dept: PHYSICAL THERAPY | Facility: OTHER | Age: 45
End: 2025-08-21
Attending: ORTHOPAEDIC SURGERY
Payer: COMMERCIAL

## 2025-08-21 DIAGNOSIS — M75.02 ADHESIVE CAPSULITIS OF LEFT SHOULDER: Primary | ICD-10-CM

## 2025-08-21 PROCEDURE — 97110 THERAPEUTIC EXERCISES: CPT

## 2025-08-21 PROCEDURE — 97140 MANUAL THERAPY 1/> REGIONS: CPT

## 2025-08-21 PROCEDURE — 97112 NEUROMUSCULAR REEDUCATION: CPT
